# Patient Record
Sex: MALE | Race: WHITE | NOT HISPANIC OR LATINO | Employment: FULL TIME | URBAN - METROPOLITAN AREA
[De-identification: names, ages, dates, MRNs, and addresses within clinical notes are randomized per-mention and may not be internally consistent; named-entity substitution may affect disease eponyms.]

---

## 2017-09-26 ENCOUNTER — HOSPITAL ENCOUNTER (OUTPATIENT)
Dept: FAMILY MEDICINE CLINIC | Facility: CLINIC | Age: 37
Setting detail: SPECIMEN
Discharge: HOME | End: 2017-09-26
Attending: NURSE PRACTITIONER | Admitting: NURSE PRACTITIONER

## 2017-12-06 ENCOUNTER — HOSPITAL ENCOUNTER (OUTPATIENT)
Dept: PHYSICAL THERAPY | Facility: HOSPITAL | Age: 37
Setting detail: RECURRING SERIES
Discharge: HOME | End: 2018-01-18
Attending: PHYSICIAN ASSISTANT | Admitting: PHYSICIAN ASSISTANT

## 2018-06-01 ENCOUNTER — HOSPITAL ENCOUNTER (OUTPATIENT)
Dept: FAMILY MEDICINE CLINIC | Facility: CLINIC | Age: 38
Setting detail: SPECIMEN
Discharge: HOME | End: 2018-06-01
Attending: PHYSICIAN ASSISTANT | Admitting: PHYSICIAN ASSISTANT

## 2018-06-01 LAB
ALBUMIN SERPL-MCNC: 4.5 G/DL (ref 3.5–4.8)
ALBUMIN/GLOB SERPL: 1.9 {RATIO} (ref 1–1.7)
ALP SERPL-CCNC: 50 IU/L (ref 32–91)
ALT SERPL-CCNC: 33 IU/L (ref 17–63)
ANION GAP SERPL CALC-SCNC: 11.3 MMOL/L (ref 10–20)
AST SERPL-CCNC: 25 IU/L (ref 15–41)
BASOPHILS # BLD AUTO: 0 10*3/UL (ref 0–0.2)
BASOPHILS NFR BLD AUTO: 0 % (ref 0–2)
BILIRUB SERPL-MCNC: 0.7 MG/DL (ref 0.3–1.2)
BUN SERPL-MCNC: 11 MG/DL (ref 8–20)
BUN/CREAT SERPL: 11 (ref 6.2–20.3)
CALCIUM SERPL-MCNC: 9.3 MG/DL (ref 8.9–10.3)
CHLORIDE SERPL-SCNC: 104 MMOL/L (ref 101–111)
CHOLEST SERPL-MCNC: 182 MG/DL
CHOLEST/HDLC SERPL: 7 {RATIO}
CONV CO2: 27 MMOL/L (ref 22–32)
CONV LDL CHOLESTEROL DIRECT: 116 MG/DL (ref 0–100)
CONV TOTAL PROTEIN: 6.9 G/DL (ref 6.1–7.9)
CREAT UR-MCNC: 1 MG/DL (ref 0.7–1.2)
DIFFERENTIAL METHOD BLD: (no result)
EOSINOPHIL # BLD AUTO: 0.1 10*3/UL (ref 0–0.3)
EOSINOPHIL # BLD AUTO: 2 % (ref 0–3)
ERYTHROCYTE [DISTWIDTH] IN BLOOD BY AUTOMATED COUNT: 13.1 % (ref 11.5–14.5)
GLOBULIN UR ELPH-MCNC: 2.4 G/DL (ref 2.5–3.8)
GLUCOSE SERPL-MCNC: 110 MG/DL (ref 65–99)
HCT VFR BLD AUTO: 45.7 % (ref 40–54)
HDLC SERPL-MCNC: 26 MG/DL
HGB BLD-MCNC: 15.6 G/DL (ref 14–18)
LDLC/HDLC SERPL: 4.4 {RATIO}
LIPID INTERPRETATION: ABNORMAL
LYMPHOCYTES # BLD AUTO: 2.1 10*3/UL (ref 0.8–4.8)
LYMPHOCYTES NFR BLD AUTO: 38 % (ref 18–42)
MCH RBC QN AUTO: 27.6 PG (ref 26–32)
MCHC RBC AUTO-ENTMCNC: 34.1 G/DL (ref 32–36)
MCV RBC AUTO: 81 FL (ref 80–94)
MONOCYTES # BLD AUTO: 0.4 10*3/UL (ref 0.1–1.3)
MONOCYTES NFR BLD AUTO: 7 % (ref 2–11)
NEUTROPHILS # BLD AUTO: 2.9 10*3/UL (ref 2.3–8.6)
NEUTROPHILS NFR BLD AUTO: 53 % (ref 50–75)
NRBC BLD AUTO-RTO: 0 /100{WBCS}
NRBC/RBC NFR BLD MANUAL: 0 10*3/UL
PLATELET # BLD AUTO: 191 10*3/UL (ref 150–450)
PMV BLD AUTO: 9.6 FL (ref 7.4–10.4)
POTASSIUM SERPL-SCNC: 4.3 MMOL/L (ref 3.6–5.1)
RBC # BLD AUTO: 5.64 10*6/UL (ref 4.6–6)
SODIUM SERPL-SCNC: 138 MMOL/L (ref 136–144)
TRIGL SERPL-MCNC: 141 MG/DL
VLDLC SERPL CALC-MCNC: 40.2 MG/DL
WBC # BLD AUTO: 5.5 10*3/UL (ref 4.5–11.5)

## 2019-04-15 ENCOUNTER — HOSPITAL ENCOUNTER (OUTPATIENT)
Dept: LAB | Facility: HOSPITAL | Age: 39
Discharge: HOME | End: 2019-04-15
Attending: FAMILY MEDICINE | Admitting: FAMILY MEDICINE

## 2019-04-15 LAB
BACTERIA SPEC AEROBE CULT: NORMAL
C DIFF TOX GENS STL QL NAA+PROBE: NEGATIVE
CDIFF INTERPRETATION: NORMAL
CONV CDIFF GDH AG: NEGATIVE
CRYPTOSP AG STL QL IA: NEGATIVE
Lab: NORMAL
MICRO REPORT STATUS: NORMAL
SPECIMEN SOURCE: NORMAL

## 2019-11-22 ENCOUNTER — APPOINTMENT (OUTPATIENT)
Dept: NUCLEAR MEDICINE | Facility: HOSPITAL | Age: 39
End: 2019-11-22

## 2019-11-22 ENCOUNTER — HOSPITAL ENCOUNTER (OUTPATIENT)
Facility: HOSPITAL | Age: 39
Setting detail: OBSERVATION
Discharge: HOME | End: 2019-11-22
Attending: EMERGENCY MEDICINE | Admitting: INTERNAL MEDICINE

## 2019-11-22 ENCOUNTER — APPOINTMENT (OUTPATIENT)
Dept: GENERAL RADIOLOGY | Facility: HOSPITAL | Age: 39
End: 2019-11-22

## 2019-11-22 VITALS
HEIGHT: 72 IN | BODY MASS INDEX: 27.74 KG/M2 | DIASTOLIC BLOOD PRESSURE: 73 MMHG | TEMPERATURE: 98.3 F | RESPIRATION RATE: 16 BRPM | SYSTOLIC BLOOD PRESSURE: 137 MMHG | OXYGEN SATURATION: 96 % | HEART RATE: 80 BPM | WEIGHT: 204.81 LBS

## 2019-11-22 DIAGNOSIS — R07.9 CHEST PAIN, UNSPECIFIED TYPE: Primary | ICD-10-CM

## 2019-11-22 PROBLEM — K21.9 GASTROESOPHAGEAL REFLUX DISEASE: Status: ACTIVE | Noted: 2019-11-22

## 2019-11-22 PROBLEM — F32.A DEPRESSION: Chronic | Status: ACTIVE | Noted: 2019-11-22

## 2019-11-22 LAB
ANION GAP SERPL CALCULATED.3IONS-SCNC: 14 MMOL/L (ref 5–15)
BASOPHILS # BLD AUTO: 0 10*3/MM3 (ref 0–0.2)
BASOPHILS NFR BLD AUTO: 0.3 % (ref 0–1.5)
BH CV NUCLEAR PRIOR STUDY: 3
BH CV STRESS BP STAGE 1: NORMAL
BH CV STRESS BP STAGE 2: NORMAL
BH CV STRESS DURATION MIN STAGE 1: 3
BH CV STRESS DURATION MIN STAGE 2: 3
BH CV STRESS DURATION MIN STAGE 3: 3
BH CV STRESS DURATION SEC STAGE 1: 0
BH CV STRESS DURATION SEC STAGE 2: 0
BH CV STRESS DURATION SEC STAGE 3: 0
BH CV STRESS GRADE STAGE 1: 10
BH CV STRESS GRADE STAGE 2: 12
BH CV STRESS GRADE STAGE 3: 14
BH CV STRESS HR STAGE 1: 117
BH CV STRESS HR STAGE 2: 136
BH CV STRESS HR STAGE 3: 159
BH CV STRESS METS STAGE 1: 5
BH CV STRESS METS STAGE 2: 7.5
BH CV STRESS METS STAGE 3: 10
BH CV STRESS PROTOCOL 1: NORMAL
BH CV STRESS RECOVERY BP: NORMAL MMHG
BH CV STRESS RECOVERY HR: 110 BPM
BH CV STRESS SPEED STAGE 1: 1.7
BH CV STRESS SPEED STAGE 2: 2.5
BH CV STRESS SPEED STAGE 3: 3.4
BH CV STRESS STAGE 1: 1
BH CV STRESS STAGE 2: 2
BH CV STRESS STAGE 3: 3
BUN BLD-MCNC: 11 MG/DL (ref 6–20)
BUN/CREAT SERPL: 11.2 (ref 7–25)
CALCIUM SPEC-SCNC: 8.9 MG/DL (ref 8.6–10.5)
CHLORIDE SERPL-SCNC: 100 MMOL/L (ref 98–107)
CO2 SERPL-SCNC: 23 MMOL/L (ref 22–29)
CREAT BLD-MCNC: 0.98 MG/DL (ref 0.76–1.27)
D DIMER PPP FEU-MCNC: <0.17 MCGFEU/ML (ref 0.17–0.59)
DEPRECATED RDW RBC AUTO: 38.9 FL (ref 37–54)
EOSINOPHIL # BLD AUTO: 0 10*3/MM3 (ref 0–0.4)
EOSINOPHIL NFR BLD AUTO: 0.7 % (ref 0.3–6.2)
ERYTHROCYTE [DISTWIDTH] IN BLOOD BY AUTOMATED COUNT: 13.3 % (ref 12.3–15.4)
GFR SERPL CREATININE-BSD FRML MDRD: 85 ML/MIN/1.73
GLUCOSE BLD-MCNC: 118 MG/DL (ref 65–99)
HCT VFR BLD AUTO: 44.9 % (ref 37.5–51)
HGB BLD-MCNC: 15.2 G/DL (ref 13–17.7)
LV EF NUC BP: 65 %
LYMPHOCYTES # BLD AUTO: 2.2 10*3/MM3 (ref 0.7–3.1)
LYMPHOCYTES NFR BLD AUTO: 30.8 % (ref 19.6–45.3)
MAXIMAL PREDICTED HEART RATE: 180 BPM
MCH RBC QN AUTO: 28.1 PG (ref 26.6–33)
MCHC RBC AUTO-ENTMCNC: 33.8 G/DL (ref 31.5–35.7)
MCV RBC AUTO: 83.1 FL (ref 79–97)
MONOCYTES # BLD AUTO: 0.3 10*3/MM3 (ref 0.1–0.9)
MONOCYTES NFR BLD AUTO: 4.2 % (ref 5–12)
NEUTROPHILS # BLD AUTO: 4.6 10*3/MM3 (ref 1.7–7)
NEUTROPHILS NFR BLD AUTO: 64 % (ref 42.7–76)
NRBC BLD AUTO-RTO: 0.1 /100 WBC (ref 0–0.2)
PERCENT MAX PREDICTED HR: 88.33 %
PLATELET # BLD AUTO: 217 10*3/MM3 (ref 140–450)
PMV BLD AUTO: 8.8 FL (ref 6–12)
POTASSIUM BLD-SCNC: 3.9 MMOL/L (ref 3.5–5.2)
RBC # BLD AUTO: 5.4 10*6/MM3 (ref 4.14–5.8)
SODIUM BLD-SCNC: 137 MMOL/L (ref 136–145)
STRESS BASELINE BP: NORMAL MMHG
STRESS BASELINE HR: 99 BPM
STRESS PERCENT HR: 104 %
STRESS POST EXERCISE DUR MIN: 8 MIN
STRESS POST EXERCISE DUR SEC: 15 SEC
STRESS POST PEAK BP: NORMAL MMHG
STRESS POST PEAK HR: 159 BPM
STRESS TARGET HR: 153 BPM
TROPONIN T SERPL-MCNC: <0.01 NG/ML (ref 0–0.03)
TROPONIN T SERPL-MCNC: <0.01 NG/ML (ref 0–0.03)
WBC NRBC COR # BLD: 7.2 10*3/MM3 (ref 3.4–10.8)

## 2019-11-22 PROCEDURE — 99284 EMERGENCY DEPT VISIT MOD MDM: CPT

## 2019-11-22 PROCEDURE — 84484 ASSAY OF TROPONIN QUANT: CPT | Performed by: EMERGENCY MEDICINE

## 2019-11-22 PROCEDURE — 0 TECHNETIUM SESTAMIBI: Performed by: INTERNAL MEDICINE

## 2019-11-22 PROCEDURE — 99235 HOSP IP/OBS SAME DATE MOD 70: CPT | Performed by: INTERNAL MEDICINE

## 2019-11-22 PROCEDURE — G0378 HOSPITAL OBSERVATION PER HR: HCPCS

## 2019-11-22 PROCEDURE — 93016 CV STRESS TEST SUPVJ ONLY: CPT | Performed by: NURSE PRACTITIONER

## 2019-11-22 PROCEDURE — 36415 COLL VENOUS BLD VENIPUNCTURE: CPT

## 2019-11-22 PROCEDURE — 80048 BASIC METABOLIC PNL TOTAL CA: CPT | Performed by: EMERGENCY MEDICINE

## 2019-11-22 PROCEDURE — 78452 HT MUSCLE IMAGE SPECT MULT: CPT | Performed by: INTERNAL MEDICINE

## 2019-11-22 PROCEDURE — 93017 CV STRESS TEST TRACING ONLY: CPT

## 2019-11-22 PROCEDURE — 71045 X-RAY EXAM CHEST 1 VIEW: CPT

## 2019-11-22 PROCEDURE — 93018 CV STRESS TEST I&R ONLY: CPT | Performed by: INTERNAL MEDICINE

## 2019-11-22 PROCEDURE — 85379 FIBRIN DEGRADATION QUANT: CPT | Performed by: EMERGENCY MEDICINE

## 2019-11-22 PROCEDURE — A9500 TC99M SESTAMIBI: HCPCS | Performed by: INTERNAL MEDICINE

## 2019-11-22 PROCEDURE — 85025 COMPLETE CBC W/AUTO DIFF WBC: CPT | Performed by: EMERGENCY MEDICINE

## 2019-11-22 PROCEDURE — 93005 ELECTROCARDIOGRAM TRACING: CPT | Performed by: EMERGENCY MEDICINE

## 2019-11-22 PROCEDURE — 78452 HT MUSCLE IMAGE SPECT MULT: CPT

## 2019-11-22 RX ORDER — NITROGLYCERIN 0.4 MG/1
0.4 TABLET SUBLINGUAL
Status: DISCONTINUED | OUTPATIENT
Start: 2019-11-22 | End: 2019-11-22 | Stop reason: HOSPADM

## 2019-11-22 RX ORDER — ASPIRIN 325 MG
325 TABLET ORAL ONCE
Status: COMPLETED | OUTPATIENT
Start: 2019-11-22 | End: 2019-11-22

## 2019-11-22 RX ORDER — MORPHINE SULFATE 4 MG/ML
4 INJECTION, SOLUTION INTRAMUSCULAR; INTRAVENOUS ONCE
Status: DISCONTINUED | OUTPATIENT
Start: 2019-11-22 | End: 2019-11-22 | Stop reason: HOSPADM

## 2019-11-22 RX ORDER — NICOTINE 21 MG/24HR
1 PATCH, TRANSDERMAL 24 HOURS TRANSDERMAL EVERY 24 HOURS
Status: DISCONTINUED | OUTPATIENT
Start: 2019-11-22 | End: 2019-11-22 | Stop reason: HOSPADM

## 2019-11-22 RX ORDER — SODIUM CHLORIDE 0.9 % (FLUSH) 0.9 %
10 SYRINGE (ML) INJECTION AS NEEDED
Status: DISCONTINUED | OUTPATIENT
Start: 2019-11-22 | End: 2019-11-22 | Stop reason: HOSPADM

## 2019-11-22 RX ORDER — ACETAMINOPHEN 650 MG/1
650 SUPPOSITORY RECTAL EVERY 4 HOURS PRN
Status: DISCONTINUED | OUTPATIENT
Start: 2019-11-22 | End: 2019-11-22 | Stop reason: HOSPADM

## 2019-11-22 RX ORDER — ACETAMINOPHEN 325 MG/1
650 TABLET ORAL EVERY 4 HOURS PRN
Status: DISCONTINUED | OUTPATIENT
Start: 2019-11-22 | End: 2019-11-22 | Stop reason: HOSPADM

## 2019-11-22 RX ORDER — ESCITALOPRAM OXALATE 10 MG/1
10 TABLET ORAL DAILY
Status: DISCONTINUED | OUTPATIENT
Start: 2019-11-22 | End: 2019-11-22 | Stop reason: HOSPADM

## 2019-11-22 RX ORDER — SODIUM CHLORIDE 0.9 % (FLUSH) 0.9 %
10 SYRINGE (ML) INJECTION EVERY 12 HOURS SCHEDULED
Status: DISCONTINUED | OUTPATIENT
Start: 2019-11-22 | End: 2019-11-22 | Stop reason: HOSPADM

## 2019-11-22 RX ORDER — ACETAMINOPHEN 160 MG/5ML
650 SOLUTION ORAL EVERY 4 HOURS PRN
Status: DISCONTINUED | OUTPATIENT
Start: 2019-11-22 | End: 2019-11-22 | Stop reason: HOSPADM

## 2019-11-22 RX ORDER — PANTOPRAZOLE SODIUM 40 MG/1
40 TABLET, DELAYED RELEASE ORAL EVERY MORNING
Status: DISCONTINUED | OUTPATIENT
Start: 2019-11-22 | End: 2019-11-22 | Stop reason: HOSPADM

## 2019-11-22 RX ORDER — BUPROPION HYDROCHLORIDE 150 MG/1
300 TABLET ORAL DAILY
Status: DISCONTINUED | OUTPATIENT
Start: 2019-11-22 | End: 2019-11-22 | Stop reason: HOSPADM

## 2019-11-22 RX ORDER — ONDANSETRON 2 MG/ML
4 INJECTION INTRAMUSCULAR; INTRAVENOUS ONCE
Status: DISCONTINUED | OUTPATIENT
Start: 2019-11-22 | End: 2019-11-22 | Stop reason: HOSPADM

## 2019-11-22 RX ADMIN — TECHNETIUM TC 99M SESTAMIBI 1 DOSE: 1 INJECTION INTRAVENOUS at 13:55

## 2019-11-22 RX ADMIN — TECHNETIUM TC 99M SESTAMIBI 1 DOSE: 1 INJECTION INTRAVENOUS at 12:30

## 2019-11-22 RX ADMIN — Medication 10 ML: at 12:27

## 2019-11-22 RX ADMIN — ASPIRIN 325 MG ORAL TABLET 325 MG: 325 PILL ORAL at 09:43

## 2019-11-22 NOTE — ED PROVIDER NOTES
Subjective   Chief complaint chest pain    History of present illness 38-year-old male complains of a cramp in his left chest that has been intermittent since yesterday morning.  No neck arm or jaw pain or shortness of breath he states it does hurt to take a deep breath and it seems to be worse with movement and exertion and better with rest.  Symptoms are moderate.  No fever chills cough congestion no leg pain or swelling no recent long car ride plane ride immobilization or foreign travels.            Review of Systems   Constitutional: Negative for chills and fever.   HENT: Negative for congestion and sinus pressure.    Eyes: Negative for photophobia and visual disturbance.   Respiratory: Positive for chest tightness. Negative for shortness of breath.    Cardiovascular: Positive for chest pain. Negative for leg swelling.   Gastrointestinal: Negative for abdominal pain and vomiting.   Endocrine: Negative for cold intolerance and heat intolerance.   Genitourinary: Negative for difficulty urinating and dysuria.   Musculoskeletal: Negative for arthralgias and back pain.   Skin: Negative for color change and pallor.   Neurological: Negative for dizziness and light-headedness.   Psychiatric/Behavioral: Negative for agitation and behavioral problems.       Past Medical History:   Diagnosis Date   • ADD (attention deficit disorder)    • Arthritis    • Depression    • GERD (gastroesophageal reflux disease)        No Known Allergies    Past Surgical History:   Procedure Laterality Date   • [facility]  [facility]       History reviewed. No pertinent family history.    Social History     Socioeconomic History   • Marital status:      Spouse name: Not on file   • Number of children: Not on file   • Years of education: Not on file   • Highest education level: Not on file   Tobacco Use   • Smoking status: Current Every Day Smoker     Packs/day: 0.50     Years: 20.00     Pack years: 10.00      Types: Cigarettes   • Smokeless tobacco: Never Used   Substance and Sexual Activity   • Alcohol use: No     Frequency: Never   • Drug use: No       Prior to Admission medications    Medication Sig Start Date End Date Taking? Authorizing Provider   buPROPion XL (WELLBUTRIN XL) 150 MG 24 hr tablet Take 150 mg by mouth Daily. 300 mg daily    Emergency, Nurse BRUNA Brown   escitalopram (LEXAPRO) 10 MG tablet Take 10 mg by mouth Daily.    Emergency, Nurse Epic, RN   neomycin-polymyxin-hydrocortisone (CORTISPORIN) 1 % solution otic solution Three drops in the affected ear three times a day for 7 days 12/17/18   Isidro Nair MD   omeprazole (priLOSEC) 20 MG capsule Take 20 mg by mouth Daily.    Emergency, Nurse Epic, RN         Objective   Physical Exam  38-year-old male awake alert no acute distress HEENT extraocular muscles intact pupils equal round reactive mouth is clear neck is supple no adenopathy no JVD no bruits lungs clear no retractions heart regular without murmur chest wall is nontender abdomen soft without tenderness or masses extremities pulses are equal throughout upper lower extremities there is no edema no cords or Homans sign no evidence of DVT.  Patient's awake alert follows commands no facial asymmetry normal speech no focal weakness.  Procedures           ED Course      Results for orders placed or performed during the hospital encounter of 11/22/19   Basic Metabolic Panel   Result Value Ref Range    Glucose 118 (H) 65 - 99 mg/dL    BUN 11 6 - 20 mg/dL    Creatinine 0.98 0.76 - 1.27 mg/dL    Sodium 137 136 - 145 mmol/L    Potassium 3.9 3.5 - 5.2 mmol/L    Chloride 100 98 - 107 mmol/L    CO2 23.0 22.0 - 29.0 mmol/L    Calcium 8.9 8.6 - 10.5 mg/dL    eGFR Non African Amer 85 >60 mL/min/1.73    BUN/Creatinine Ratio 11.2 7.0 - 25.0    Anion Gap 14.0 5.0 - 15.0 mmol/L   Troponin   Result Value Ref Range    Troponin T <0.010 0.000 - 0.030 ng/mL   D-dimer, Quantitative   Result Value Ref Range    D-Dimer,  Quantitative <0.17 (L) 0.17 - 0.59 MCGFEU/mL   CBC Auto Differential   Result Value Ref Range    WBC 7.20 3.40 - 10.80 10*3/mm3    RBC 5.40 4.14 - 5.80 10*6/mm3    Hemoglobin 15.2 13.0 - 17.7 g/dL    Hematocrit 44.9 37.5 - 51.0 %    MCV 83.1 79.0 - 97.0 fL    MCH 28.1 26.6 - 33.0 pg    MCHC 33.8 31.5 - 35.7 g/dL    RDW 13.3 12.3 - 15.4 %    RDW-SD 38.9 37.0 - 54.0 fl    MPV 8.8 6.0 - 12.0 fL    Platelets 217 140 - 450 10*3/mm3    Neutrophil % 64.0 42.7 - 76.0 %    Lymphocyte % 30.8 19.6 - 45.3 %    Monocyte % 4.2 (L) 5.0 - 12.0 %    Eosinophil % 0.7 0.3 - 6.2 %    Basophil % 0.3 0.0 - 1.5 %    Neutrophils, Absolute 4.60 1.70 - 7.00 10*3/mm3    Lymphocytes, Absolute 2.20 0.70 - 3.10 10*3/mm3    Monocytes, Absolute 0.30 0.10 - 0.90 10*3/mm3    Eosinophils, Absolute 0.00 0.00 - 0.40 10*3/mm3    Basophils, Absolute 0.00 0.00 - 0.20 10*3/mm3    nRBC 0.1 0.0 - 0.2 /100 WBC     Xr Chest 1 View    Result Date: 11/22/2019  Normal chest  Electronically Signed By-Vinny García On:11/22/2019 9:46 AM This report was finalized on [id number] by  Vinny García, .    Medications   sodium chloride 0.9 % flush 10 mL (not administered)   Morphine sulfate (PF) injection 4 mg (not administered)   ondansetron (ZOFRAN) injection 4 mg (not administered)   aspirin tablet 325 mg (325 mg Oral Given 11/22/19 0943)     EKG my interpretation normal sinus rhythm rate 100 normal axis no hypertrophy QTC of 437 normal EKG              MDM  Number of Diagnoses or Management Options  Chest pain, unspecified type:   Diagnosis management comments: Medical decision making.  Patient IV established placed on monitor given aspirin p.o. patient did not want any pain medication.  The patient CBC electrolytes d-dimer troponin within normal limits chest x-ray negative.  The patient repeat exam is resting comfortably.  He does have risk factors for heart disease he is a smoker he has had some exertional left-sided chest pain.  I see no evidence of acute DVT or  pulmonary embolism or heart or aortic dissection by clinical exam.  We talked about these findings.  We talked about outpatient inpatient.  He will be placed in observation hospitalist paged stable unremarkable improved ER course.      Final diagnoses:   Chest pain, unspecified type              Vinny Rosa MD  11/22/19 2580

## 2019-11-22 NOTE — DISCHARGE SUMMARY
[facility] Medicine Services  DISCHARGE SUMMARY        Prepared For PCP:  Aleah Alcantar PA    Patient Name: Max Triana  : 1980  MRN: [id number]      Date of Admission:   2019    Date of Discharge:  2019    Length of stay:  LOS: 0 days     Hospital Course     Presenting Problem:   Chest pain, unspecified type [R07.9]    Active Hospital Problems:  * Chest pain- (present on admission)    -EKG Sinus Tachycardia  -Troponin normal  -Received ASA 325mg  -Stress Test neg  -Check serial Troponins     Depression- (present on admission)     -Continue Wellbutrin and Lexapro     Gastroesophageal reflux disease- (present on admission)    -Continue PPI         Resolved Hospital Problems:  No notes have been filed under this hospital service.  Service: Hospitalist    Hospital Course:  Max Triana is a 38 y.o. male presented to [facility] ED on 19, with c/o left sided ChestPain that began this morning while he was at work. Pt states pain is off and on, sharp and does not radiate. He states he did have some SOA and lighthadedness initially with the pain, but has now resolved. Pt denies nausea, vomiting, dizziness and diaphoresis.      Pt was given ASA 325mg in the ED. Pt's  EKG was Sinus Tachycardia, otherwise normal. CXR: No active disease, lungs clear. Troponin negative, other labs within normal limits.  Patient was admitted to the medical floor, underwent stress test negative, will charge patient home, continuous murmur, follow with PCP in a week.         Recommendation for Outpatient Providers:     Reasons For Change In Medications and Indications for New Medications:        Day of Discharge     HPI:       Vital Signs:   Temp:  [98.1 °F (36.7 °C)-98.6 °F (37 °C)] 98.3 °F (36.8 °C)  Heart Rate:  [] 80  Resp:  [14-17] 16  BP: (122-143)/(65-95) 137/73     Physical Exam:  Physical Exam   Constitutional: He is oriented to person, place, and time. He appears  well-developed and well-nourished. No distress.   HENT:   Head: Normocephalic and atraumatic.   Right Ear: External ear normal.   Left Ear: External ear normal.   Nose: Nose normal.   Mouth/Throat: Oropharynx is clear and moist. No oropharyngeal exudate.   Eyes: Conjunctivae and EOM are normal. Pupils are equal, round, and reactive to light. Right eye exhibits no discharge. Left eye exhibits no discharge. No scleral icterus.   Neck: Normal range of motion. No JVD present. No tracheal deviation present. No thyromegaly present.   Cardiovascular: Normal rate, regular rhythm, normal heart sounds and intact distal pulses. Exam reveals no gallop and no friction rub.   No murmur heard.  Pulmonary/Chest: Effort normal and breath sounds normal. No stridor. No respiratory distress. He has no wheezes. He has no rales. He exhibits no tenderness.   Abdominal: Soft. Bowel sounds are normal. He exhibits no distension and no mass. There is no tenderness. There is no rebound and no guarding. No hernia.   Musculoskeletal: Normal range of motion. He exhibits no edema, tenderness or deformity.   Lymphadenopathy:     He has no cervical adenopathy.   Neurological: He is alert and oriented to person, place, and time. No cranial nerve deficit or sensory deficit. He exhibits normal muscle tone. Coordination normal.   Skin: Skin is warm and dry. No rash noted. He is not diaphoretic. No erythema.   Psychiatric: He has a normal mood and affect. His behavior is normal.   Nursing note and vitals reviewed.      Pertinent  and/or Most Recent Results     Results from last 7 days   Lab Units 11/22/19  0931   WBC 10*3/mm3 7.20   HEMOGLOBIN g/dL 15.2   HEMATOCRIT % 44.9   PLATELETS 10*3/mm3 217   SODIUM mmol/L 137   POTASSIUM mmol/L 3.9   CHLORIDE mmol/L 100   CO2 mmol/L 23.0   BUN mg/dL 11   CREATININE mg/dL 0.98   GLUCOSE mg/dL 118*   CALCIUM mg/dL 8.9           Invalid input(s): PROT, LABALBU        Invalid input(s): TG, LDLCALC, LDLREALC  Results  from last 7 days   Lab Units 11/22/19  1152 11/22/19  0931   TROPONIN T ng/mL <0.010 <0.010       Brief Urine Lab Results     None          Microbiology Results Abnormal     None          Xr Chest 1 View    Result Date: 11/22/2019  Impression: Normal chest  Electronically Signed By-Vinny García On:11/22/2019 9:46 AM This report was finalized on [id number] by  Vinny García, .                     Home or Self Care          Test Results Pending at Discharge        Procedures Performed           Consults:   Consults     Date and Time Order Name Status Description    11/22/2019 1028 Hospitalist (on-call MD unless specified) Completed             Discharge Details        Discharge Medications      Continue These Medications      Instructions Start Date   buPROPion  MG 24 hr tablet  Commonly known as:  WELLBUTRIN XL   300 mg, Oral, Daily      escitalopram 10 MG tablet  Commonly known as:  LEXAPRO   10 mg, Oral, Daily      omeprazole 20 MG capsule  Commonly known as:  priLOSEC   20 mg, Oral, Daily             No Known Allergies      Discharge Disposition:  Home or Self Care    Diet:  Hospital:  Diet Order   Procedures   • Diet Cardiac; Healthy Heart     Discharge Activity:     CODE STATUS:    Code Status and Medical Interventions:   Ordered at: 11/22/19 1141     Level Of Support Discussed With:    Patient     Code Status:    CPR     Medical Interventions (Level of Support Prior to Arrest):    Full     No future appointments.      Condition on Discharge:      Stable      Follow-up Appointments  No future appointments.      Electronically signed by Abdirahman Ann MD, 11/22/19, 4:51 PM.    Time: I spent 35  minutes on this discharge activity which included face-to-face encounter with the patient/reviewing the data in the system/coordination of the care with the nursing staff as well as consultants/documentation/entering orders.

## 2019-11-22 NOTE — ASSESSMENT & PLAN NOTE
-EKG Sinus Tachycardia  -Troponin normal  -Received ASA 325mg  -Stress Test neg  -Check serial Troponins

## 2019-11-22 NOTE — H&P
[facility] Medicine Services      Patient Name: Max Triana  : 1980  MRN: [id number]  Primary Care Physician: Aleah Alcantar PA  Date of admission: 2019    Patient Care Team:  Aleah Alcantar PA as PCP - General  Aleah Alcantar PA as PCP - Family Medicine          Subjective   History Present Illness     Chief Complaint:   Chief Complaint   Patient presents with   • Chest Pain       Mr. Triana is a 38 y.o. male presented to [facility] ED on 19, with c/o left sided ChestPain that began this morning while he was at work. Pt states pain is off and on, sharp and does not radiate. He states he did have some SOA and lighthadedness initially with the pain, but has now resolved. Pt denies nausea, vomiting, dizziness and diaphoresis.     Pt was given ASA 325mg in the ED. Pt's  EKG was Sinus Tachycardia, otherwise normal. CXR: No active disease, lungs clear. Troponin negative, other labs within normal limits. Stress test ordered, will be admitted for observation.       Review of Systems   Constitution: Negative. Negative for diaphoresis, fever and weakness.   HENT: Negative.    Eyes: Negative.    Cardiovascular: Positive for chest pain.   Respiratory: Positive for shortness of breath.    Endocrine: Negative.    Hematologic/Lymphatic: Negative.    Skin: Negative.    Musculoskeletal: Negative.    Gastrointestinal: Negative.    Genitourinary: Negative.    Neurological: Positive for light-headedness.   Psychiatric/Behavioral: Negative.    Allergic/Immunologic: Negative.            Personal History     Past Medical History:   Past Medical History:   Diagnosis Date   • ADD (attention deficit disorder)    • Arthritis    • Depression    • GERD (gastroesophageal reflux disease)        Surgical History:      Past Surgical History:   Procedure Laterality Date   • DENTAL PROCEDURE      whisdom teeth extraction           Family History: He was adopted. Family history is unknown by  patient. Otherwise pertinent FHx was reviewed and unremarkable.     Social History:  reports that he has been smoking cigarettes.  He has a 10.00 pack-year smoking history. He has never used smokeless tobacco. He reports that he does not drink alcohol or use drugs.      Medications:  Prior to Admission medications    Medication Sig Start Date End Date Taking? Authorizing Provider   buPROPion XL (WELLBUTRIN XL) 150 MG 24 hr tablet Take 300 mg by mouth Daily.    Emergency, Nurse Kevin RN   escitalopram (LEXAPRO) 10 MG tablet Take 10 mg by mouth Daily.    Emergency, Nurse Kevin RN   omeprazole (priLOSEC) 20 MG capsule Take 20 mg by mouth Daily.    Emergency, Nurse Epic, RN   neomycin-polymyxin-hydrocortisone (CORTISPORIN) 1 % solution otic solution Three drops in the affected ear three times a day for 7 days 12/17/18 11/22/19  Isidro Nair MD       Allergies:  No Known Allergies    Objective   Objective     Vital Signs  Temp:  [98.1 °F (36.7 °C)] 98.1 °F (36.7 °C)  Heart Rate:  [] 95  Resp:  [17] 17  BP: (129-142)/(65-95) 131/65  SpO2:  [94 %-98 %] 94 %  on   ;   Device (Oxygen Therapy): room air  Body mass index is 29 kg/m².    Physical Exam   Constitutional: He is oriented to person, place, and time. He appears well-developed and well-nourished.   HENT:   Head: Normocephalic and atraumatic.   Cardiovascular: Normal rate, regular rhythm, normal heart sounds and intact distal pulses.   Pulmonary/Chest: Effort normal and breath sounds normal.   Abdominal: Soft. Bowel sounds are normal.   Musculoskeletal: Normal range of motion. He exhibits no edema.   Neurological: He is alert and oriented to person, place, and time.   Skin: Skin is warm and dry.       Results Review:  I have personally reviewed most recent cardiac tracings, lab results and radiology images and interpretations and agree with findings.    Results from last 7 days   Lab Units 11/22/19  0931   WBC 10*3/mm3 7.20   HEMOGLOBIN g/dL 15.2    HEMATOCRIT % 44.9   PLATELETS 10*3/mm3 217     Results from last 7 days   Lab Units 11/22/19 0931   SODIUM mmol/L 137   POTASSIUM mmol/L 3.9   CHLORIDE mmol/L 100   CO2 mmol/L 23.0   BUN mg/dL 11   CREATININE mg/dL 0.98   GLUCOSE mg/dL 118*   CALCIUM mg/dL 8.9   TROPONIN T ng/mL <0.010     Estimated Creatinine Clearance: 121 mL/min (by C-G formula based on SCr of 0.98 mg/dL).  Brief Urine Lab Results     None        Imaging Results (Last 24 Hours)     Procedure Component Value Units Date/Time    XR Chest 1 View [[id number]] Collected:  11/22/19 0946     Updated:  11/22/19 0949    Narrative:       DATE OF EXAM:  11/22/2019 9:36 AM     PROCEDURE:  XR CHEST 1 VW-     INDICATIONS:  Chest pain     COMPARISON:  No Comparisons Available     TECHNIQUE:   Single radiographic AP view of the chest was obtained.     FINDINGS:  The cardiovascular configuration is normal and the lungs are clear        Impression:       Normal chest     Electronically Signed By-Vinny García On:11/22/2019 9:46 AM  This report was finalized on [id number] by  Vinny García .            Microbiology Results (last 10 days)     ** No results found for the last 240 hours. **          ECG/EMG Results (most recent)     Procedure Component Value Units Date/Time    ECG 12 Lead [[id number]] Collected:  11/22/19 0918     Updated:  11/22/19 0920    Narrative:       HEART RATE= 104  bpm  RR Interval= 576  ms  NC Interval= 158  ms  P Horizontal Axis= 6  deg  P Front Axis= 51  deg  QRSD Interval= 83  ms  QT Interval= 332  ms  QRS Axis= 28  deg  T Wave Axis= 38  deg  - OTHERWISE NORMAL ECG -  Sinus tachycardia  No previous ECG available for comparison  Electronically Signed By:   Date and Time of Study: 2019-11-22 09:18:53                    Xr Chest 1 View    Result Date: 11/22/2019  Normal chest  Electronically Signed ByCecily García On:11/22/2019 9:46 AM This report was finalized on [id number] by  Vinny García .        Estimated Creatinine Clearance: 121  mL/min (by C-G formula based on SCr of 0.98 mg/dL).    Assessment/Plan   Assessment/Plan       Active Hospital Problems:  * Chest pain- (present on admission)    -EKG Sinus Tachycardia  -Troponin normal  -Received ASA 325mg  -Stress Test ordered  -Check serial Troponins     Depression- (present on admission)     -Continue Wellbutrin and Lexapro     Gastroesophageal reflux disease- (present on admission)    -Continue PPI                 VTE Prophylaxis - Encourage frequent ambulation.    CODE STATUS:    There are no questions and answers to display.       Admission Status:  I believe this patient meets observationcriteria.      I discussed the patients findings and my recommendations with patient.        Electronically signed by SAMANTHA Bernstein, 11/22/19, 11:08 AM.  [facility] Hospitalist Team

## 2019-11-22 NOTE — PLAN OF CARE
Problem: Patient Care Overview  Goal: Plan of Care Review  Outcome: Ongoing (interventions implemented as appropriate)   11/22/19 1225   Coping/Psychosocial   Plan of Care Reviewed With patient   Plan of Care Review   Progress no change   OTHER   Outcome Summary Pt waiting to get myoview today. Plan of care discussed.      Goal: Interprofessional Rounds/Family Conf  Outcome: Ongoing (interventions implemented as appropriate)   11/22/19 1225   Interdisciplinary Rounds/Family Conf   Participants nursing       Problem: Pain, Chronic (Adult)  Goal: Identify Related Risk Factors and Signs and Symptoms  Outcome: Ongoing (interventions implemented as appropriate)   11/22/19 1225   Pain, Chronic (Adult)   Signs and Symptoms (Chronic Pain) verbalization of pain descriptors     Goal: Acceptable Pain/Comfort Level and Functional Ability  Outcome: Ongoing (interventions implemented as appropriate)   11/22/19 1225   Pain, Chronic (Adult)   Acceptable Pain/Comfort Level and Functional Ability making progress toward outcome

## 2019-11-25 NOTE — PROGRESS NOTES
Case Management Discharge Note                  Final Discharge Disposition Code: 01 - home or self-care

## 2020-07-31 PROCEDURE — U0003 INFECTIOUS AGENT DETECTION BY NUCLEIC ACID (DNA OR RNA); SEVERE ACUTE RESPIRATORY SYNDROME CORONAVIRUS 2 (SARS-COV-2) (CORONAVIRUS DISEASE [COVID-19]), AMPLIFIED PROBE TECHNIQUE, MAKING USE OF HIGH THROUGHPUT TECHNOLOGIES AS DESCRIBED BY CMS-2020-01-R: HCPCS | Performed by: NURSE PRACTITIONER

## 2021-03-04 ENCOUNTER — HOSPITAL ENCOUNTER (EMERGENCY)
Facility: HOSPITAL | Age: 41
Discharge: HOME | End: 2021-03-04
Admitting: EMERGENCY MEDICINE

## 2021-03-04 ENCOUNTER — APPOINTMENT (OUTPATIENT)
Dept: CT IMAGING | Facility: HOSPITAL | Age: 41
End: 2021-03-04

## 2021-03-04 VITALS
OXYGEN SATURATION: 98 % | HEART RATE: 89 BPM | SYSTOLIC BLOOD PRESSURE: 122 MMHG | WEIGHT: 211.64 LBS | BODY MASS INDEX: 28.67 KG/M2 | HEIGHT: 72 IN | TEMPERATURE: 98 F | DIASTOLIC BLOOD PRESSURE: 76 MMHG | RESPIRATION RATE: 17 BRPM

## 2021-03-04 DIAGNOSIS — R10.30 LOWER ABDOMINAL PAIN: Primary | ICD-10-CM

## 2021-03-04 DIAGNOSIS — K92.1 BLOOD IN STOOL: ICD-10-CM

## 2021-03-04 LAB
ALBUMIN SERPL-MCNC: 4.3 G/DL (ref 3.5–5.2)
ALBUMIN/GLOB SERPL: 1.7 G/DL
ALP SERPL-CCNC: 57 U/L (ref 39–117)
ALT SERPL W P-5'-P-CCNC: 25 U/L (ref 1–41)
ANION GAP SERPL CALCULATED.3IONS-SCNC: 8 MMOL/L (ref 5–15)
AST SERPL-CCNC: 19 U/L (ref 1–40)
BASOPHILS # BLD AUTO: 0 10*3/MM3 (ref 0–0.2)
BASOPHILS NFR BLD AUTO: 0.5 % (ref 0–1.5)
BILIRUB SERPL-MCNC: 0.2 MG/DL (ref 0–1.2)
BILIRUB UR QL STRIP: NEGATIVE
BUN SERPL-MCNC: 14 MG/DL (ref 6–20)
BUN/CREAT SERPL: 14.9 (ref 7–25)
CALCIUM SPEC-SCNC: 9.2 MG/DL (ref 8.6–10.5)
CHLORIDE SERPL-SCNC: 102 MMOL/L (ref 98–107)
CLARITY UR: CLEAR
CO2 SERPL-SCNC: 27 MMOL/L (ref 22–29)
COLOR UR: YELLOW
CREAT SERPL-MCNC: 0.94 MG/DL (ref 0.76–1.27)
DEPRECATED RDW RBC AUTO: 37.6 FL (ref 37–54)
EOSINOPHIL # BLD AUTO: 0.2 10*3/MM3 (ref 0–0.4)
EOSINOPHIL NFR BLD AUTO: 2.1 % (ref 0.3–6.2)
ERYTHROCYTE [DISTWIDTH] IN BLOOD BY AUTOMATED COUNT: 13.2 % (ref 12.3–15.4)
ERYTHROCYTE [SEDIMENTATION RATE] IN BLOOD: 1 MM/HR (ref 0–15)
GFR SERPL CREATININE-BSD FRML MDRD: 88 ML/MIN/1.73
GLOBULIN UR ELPH-MCNC: 2.5 GM/DL
GLUCOSE SERPL-MCNC: 103 MG/DL (ref 65–99)
GLUCOSE UR STRIP-MCNC: NEGATIVE MG/DL
HCT VFR BLD AUTO: 44.2 % (ref 37.5–51)
HGB BLD-MCNC: 15.2 G/DL (ref 13–17.7)
HGB UR QL STRIP.AUTO: NEGATIVE
INR PPP: 1.08 (ref 0.93–1.1)
KETONES UR QL STRIP: NEGATIVE
LEUKOCYTE ESTERASE UR QL STRIP.AUTO: NEGATIVE
LYMPHOCYTES # BLD AUTO: 2.5 10*3/MM3 (ref 0.7–3.1)
LYMPHOCYTES NFR BLD AUTO: 33.7 % (ref 19.6–45.3)
MCH RBC QN AUTO: 28.1 PG (ref 26.6–33)
MCHC RBC AUTO-ENTMCNC: 34.4 G/DL (ref 31.5–35.7)
MCV RBC AUTO: 81.8 FL (ref 79–97)
MONOCYTES # BLD AUTO: 0.4 10*3/MM3 (ref 0.1–0.9)
MONOCYTES NFR BLD AUTO: 5.3 % (ref 5–12)
NEUTROPHILS NFR BLD AUTO: 4.4 10*3/MM3 (ref 1.7–7)
NEUTROPHILS NFR BLD AUTO: 58.4 % (ref 42.7–76)
NITRITE UR QL STRIP: NEGATIVE
NRBC BLD AUTO-RTO: 0.1 /100 WBC (ref 0–0.2)
PH UR STRIP.AUTO: 7.5 [PH] (ref 5–8)
PLATELET # BLD AUTO: 184 10*3/MM3 (ref 140–450)
PMV BLD AUTO: 9.5 FL (ref 6–12)
POTASSIUM SERPL-SCNC: 4.3 MMOL/L (ref 3.5–5.2)
PROT SERPL-MCNC: 6.8 G/DL (ref 6–8.5)
PROT UR QL STRIP: NEGATIVE
PROTHROMBIN TIME: 11.8 SECONDS (ref 9.6–11.7)
RBC # BLD AUTO: 5.41 10*6/MM3 (ref 4.14–5.8)
SODIUM SERPL-SCNC: 137 MMOL/L (ref 136–145)
SP GR UR STRIP: 1.02 (ref 1–1.03)
UROBILINOGEN UR QL STRIP: NORMAL
WBC # BLD AUTO: 7.6 10*3/MM3 (ref 3.4–10.8)

## 2021-03-04 PROCEDURE — 0 IOPAMIDOL PER 1 ML: Performed by: NURSE PRACTITIONER

## 2021-03-04 PROCEDURE — 74177 CT ABD & PELVIS W/CONTRAST: CPT

## 2021-03-04 PROCEDURE — 85610 PROTHROMBIN TIME: CPT | Performed by: NURSE PRACTITIONER

## 2021-03-04 PROCEDURE — 85652 RBC SED RATE AUTOMATED: CPT | Performed by: NURSE PRACTITIONER

## 2021-03-04 PROCEDURE — 99283 EMERGENCY DEPT VISIT LOW MDM: CPT

## 2021-03-04 PROCEDURE — 85025 COMPLETE CBC W/AUTO DIFF WBC: CPT | Performed by: NURSE PRACTITIONER

## 2021-03-04 PROCEDURE — 80053 COMPREHEN METABOLIC PANEL: CPT | Performed by: NURSE PRACTITIONER

## 2021-03-04 PROCEDURE — 81003 URINALYSIS AUTO W/O SCOPE: CPT | Performed by: NURSE PRACTITIONER

## 2021-03-04 RX ORDER — SODIUM CHLORIDE 0.9 % (FLUSH) 0.9 %
10 SYRINGE (ML) INJECTION AS NEEDED
Status: DISCONTINUED | OUTPATIENT
Start: 2021-03-04 | End: 2021-03-04 | Stop reason: HOSPADM

## 2021-03-04 RX ADMIN — IOPAMIDOL 100 ML: 755 INJECTION, SOLUTION INTRAVENOUS at 09:48

## 2021-03-04 RX ADMIN — SODIUM CHLORIDE 500 ML: 9 INJECTION, SOLUTION INTRAVENOUS at 08:59

## 2021-03-04 NOTE — DISCHARGE INSTRUCTIONS
Continue current home medications.  Drink plenty of fluids.  Close follow-up with your primary care provider and/or gastroenterology especially if your symptoms persist.  Return for new or worsening symptoms.

## 2021-03-04 NOTE — ED PROVIDER NOTES
Subjective   Patient is a 40-year-old white male with history of GERD who presents with complaints of lower abdominal pain and cramping with 2 small episodes of bright red blood mixed with his stool this morning.  States he has had some intermittent abdominal pain for the last couple of weeks that seems to be worse when he wakes up in the morning and lasts about noon and then goes away.  States he had this pain started on his way to work this morning and it worsened after he got to work.  He states he went to the restroom and had a bowel movement and noticed some blood on the tissue when he wiped.  He states he then had another bowel movement shortly after and noticed a few specks of blood in the toilet that was mixed with some brown stool.  He reports some nausea but denies any vomiting or hematemesis.  He denies any fever or chills.  Denies any urinary complaint.          Review of Systems   Constitutional: Negative for chills and fever.   Respiratory: Negative for shortness of breath.    Cardiovascular: Negative for chest pain.   Gastrointestinal: Positive for abdominal pain, blood in stool and nausea. Negative for constipation, diarrhea and vomiting.   Genitourinary: Negative for decreased urine volume, difficulty urinating, dysuria, flank pain, frequency and urgency.       Past Medical History:   Diagnosis Date   • ADD (attention deficit disorder)    • Arthritis    • Depression    • GERD (gastroesophageal reflux disease)        No Known Allergies    Past Surgical History:   Procedure Laterality Date   • DENTAL PROCEDURE  2012    whisdom teeth extraction       Family History   Adopted: Yes   Family history unknown: Yes       Social History     Socioeconomic History   • Marital status:      Spouse name: Not on file   • Number of children: Not on file   • Years of education: Not on file   • Highest education level: Not on file   Tobacco Use   • Smoking status: Current Every Day Smoker     Packs/day: 0.50      Years: 20.00     Pack years: 10.00     Types: Cigarettes   • Smokeless tobacco: Never Used   • Tobacco comment: no vaping/no passive exp.   Substance and Sexual Activity   • Alcohol use: No     Frequency: Never   • Drug use: No   • Sexual activity: Defer           Objective   Physical Exam  Vital signs and triage nurse note reviewed.  Constitutional: Awake, alert; well-developed and well-nourished. No acute distress is noted.  HEENT: Normocephalic, atraumatic; pupils are PERRL with intact EOM; oropharynx is pink and moist without exudate or erythema.  No drooling or pooling of oral secretions.  Neck: Supple, full range of motion without pain; no cervical lymphadenopathy. Normal phonation.  Cardiovascular: Regular rate and rhythm, normal S1-S2.  No murmur noted.  Pulmonary: Respiratory effort regular nonlabored, breath sounds clear to auscultation all fields.  Abdomen: Soft, mildly tender across the lower abdomen but greater in the right lower quadrant, nondistended with normoactive bowel sounds; no rebound or guarding.  No CVAT.  Musculoskeletal: Independent range of motion of all extremities with no palpable tenderness or edema.  Neuro: Alert oriented x3, speech is clear and appropriate, GCS 15.    Skin: Flesh tone, warm, dry, intact; no erythematous or petechial rash or lesion.      Procedures           ED Course      Labs Reviewed   COMPREHENSIVE METABOLIC PANEL - Abnormal; Notable for the following components:       Result Value    Glucose 103 (*)     All other components within normal limits    Narrative:     GFR Normal >60  Chronic Kidney Disease <60  Kidney Failure <15     PROTIME-INR - Abnormal; Notable for the following components:    Protime 11.8 (*)     All other components within normal limits   URINALYSIS W/ CULTURE IF INDICATED - Normal    Narrative:     Urine microscopic not indicated.   SEDIMENTATION RATE - Normal   CBC WITH AUTO DIFFERENTIAL - Normal   CBC AND DIFFERENTIAL    Narrative:     The  following orders were created for panel order CBC & Differential.  Procedure                               Abnormality         Status                     ---------                               -----------         ------                     CBC Auto Differential[[id number]]        Normal              Final result                 Please view results for these tests on the individual orders.     Ct Abdomen Pelvis With Contrast    Result Date: 3/4/2021   1. There are no acute findings in the abdomen or pelvis to explain the patient's abdominal pain or bloody stools. 2. Diverticular changes are scattered within the colon without evidence of acute diverticulitis. 3. The appendix is normal. 4. Low-density lesions in the right hepatic lobe. Benign etiologies such as cysts or hemangiomas are favored. Consider correlation to dedicated MRI abdomen without with contrast liver protocol, on a nonemergent basis. 5. Mild prostatomegaly.    Electronically Signed By-Katya Tinajero MD On:3/4/2021 9:53 AM This report was finalized on [id number] by  Katya Tinajero MD.    Medications   sodium chloride 0.9 % flush 10 mL (has no administration in time range)   sodium chloride 0.9 % bolus 500 mL (0 mL Intravenous Stopped 3/4/21 1047)   iopamidol (ISOVUE-370) 76 % injection 100 mL (100 mL Intravenous Given 3/4/21 0948)                                          MDM  Number of Diagnoses or Management Options  Diagnosis management comments: Comorbidities: GERD  Differentials: Hemorrhoid, IBS, IBD, diverticulitis, ischemic bowel, appendicitis, colitis;this list is not all inclusive and does not constitute the entirety of considered causes  Discussion with provider:  Radiology interpretation: X-rays reviewed by me and interpreted by radiologist: As above  Lab interpretation: Labs viewed by me significant for: As above    Patient had IV established.  He had labs and CT obtained.  He was given fluid bolus.    Patient has a grossly unremarkable  ED work-up today.  CBC shows a normal WBC.  Metabolic panel is grossly unremarkable.  Urinalysis is within normal limits.  CT of the abdomen pelvis shows no acute findings in the abdomen or pelvis.  Diverticular changes without evidence of diverticulitis.  Normal appendix.  Low-density lesion in the right hepatic lobe.  Benign etiologies such as cyst or hemangiomas are favored.  Follow-up MRI on a nonemergent basis may be considered.  Mild prostatomegaly.    On reexamination the patient is resting comfortably.  He is in no acute distress.  He denies any new complaints at this time.  He has had no further bowel movements since arrival to the ED.  He is well-appearing and has stable vital signs.    Diagnosis and treatment plan discussed with patient.  Patient agreeable to plan.   I discussed findings with patient who voices understanding of discharge instructions, signs and symptoms requiring return to ED; discharged improved and in stable condition with follow up for re-evaluation.           Amount and/or Complexity of Data Reviewed  Clinical lab tests: ordered and reviewed  Tests in the radiology section of CPT®: ordered and reviewed    Patient Progress  Patient progress: stable      Final diagnoses:   Lower abdominal pain   Blood in stool            Sharmin Cifuentes APRN  03/04/21 1031       Sharmin Cifuentes APRN  03/04/21 1047

## 2021-03-11 ENCOUNTER — OFFICE (AMBULATORY)
Dept: URBAN - METROPOLITAN AREA CLINIC 64 | Facility: CLINIC | Age: 41
End: 2021-03-11

## 2021-03-11 VITALS
DIASTOLIC BLOOD PRESSURE: 86 MMHG | WEIGHT: 210 LBS | HEART RATE: 81 BPM | HEIGHT: 72 IN | SYSTOLIC BLOOD PRESSURE: 130 MMHG

## 2021-03-11 DIAGNOSIS — K62.5 HEMORRHAGE OF ANUS AND RECTUM: ICD-10-CM

## 2021-03-11 DIAGNOSIS — R10.32 LEFT LOWER QUADRANT PAIN: ICD-10-CM

## 2021-03-11 DIAGNOSIS — R19.4 CHANGE IN BOWEL HABIT: ICD-10-CM

## 2021-03-11 PROCEDURE — 99203 OFFICE O/P NEW LOW 30 MIN: CPT | Performed by: INTERNAL MEDICINE

## 2021-03-11 RX ORDER — DICYCLOMINE HYDROCHLORIDE 20 MG/1
40 TABLET ORAL
Qty: 60 | Refills: 11 | Status: COMPLETED
Start: 2021-03-11 | End: 2022-04-14

## 2021-03-11 RX ORDER — METRONIDAZOLE 500 MG/1
1000 TABLET, FILM COATED ORAL
Qty: 28 | Refills: 0 | Status: COMPLETED
Start: 2021-03-11 | End: 2022-04-14

## 2021-03-22 ENCOUNTER — ON CAMPUS - OUTPATIENT (AMBULATORY)
Dept: URBAN - METROPOLITAN AREA HOSPITAL 2 | Facility: HOSPITAL | Age: 41
End: 2021-03-22

## 2021-03-22 VITALS
DIASTOLIC BLOOD PRESSURE: 80 MMHG | RESPIRATION RATE: 15 BRPM | DIASTOLIC BLOOD PRESSURE: 88 MMHG | DIASTOLIC BLOOD PRESSURE: 83 MMHG | OXYGEN SATURATION: 94 % | OXYGEN SATURATION: 98 % | SYSTOLIC BLOOD PRESSURE: 120 MMHG | HEART RATE: 90 BPM | HEART RATE: 99 BPM | DIASTOLIC BLOOD PRESSURE: 129 MMHG | OXYGEN SATURATION: 95 % | DIASTOLIC BLOOD PRESSURE: 89 MMHG | SYSTOLIC BLOOD PRESSURE: 141 MMHG | DIASTOLIC BLOOD PRESSURE: 55 MMHG | SYSTOLIC BLOOD PRESSURE: 153 MMHG | HEART RATE: 100 BPM | WEIGHT: 205 LBS | TEMPERATURE: 97.8 F | HEART RATE: 97 BPM | HEIGHT: 72 IN | HEART RATE: 89 BPM | HEART RATE: 94 BPM | SYSTOLIC BLOOD PRESSURE: 151 MMHG | SYSTOLIC BLOOD PRESSURE: 124 MMHG | OXYGEN SATURATION: 97 % | HEART RATE: 93 BPM | OXYGEN SATURATION: 100 % | SYSTOLIC BLOOD PRESSURE: 136 MMHG | HEART RATE: 85 BPM | OXYGEN SATURATION: 99 % | DIASTOLIC BLOOD PRESSURE: 73 MMHG | SYSTOLIC BLOOD PRESSURE: 123 MMHG | RESPIRATION RATE: 16 BRPM

## 2021-03-22 DIAGNOSIS — K57.30 DIVERTICULOSIS OF LARGE INTESTINE WITHOUT PERFORATION OR ABS: ICD-10-CM

## 2021-03-22 DIAGNOSIS — R19.4 CHANGE IN BOWEL HABIT: ICD-10-CM

## 2021-03-22 DIAGNOSIS — K62.5 HEMORRHAGE OF ANUS AND RECTUM: ICD-10-CM

## 2021-03-22 PROCEDURE — 45378 DIAGNOSTIC COLONOSCOPY: CPT | Performed by: INTERNAL MEDICINE

## 2021-08-26 PROCEDURE — U0004 COV-19 TEST NON-CDC HGH THRU: HCPCS | Performed by: FAMILY MEDICINE

## 2021-08-30 ENCOUNTER — TELEPHONE (OUTPATIENT)
Dept: URGENT CARE | Facility: CLINIC | Age: 41
End: 2021-08-30

## 2022-04-14 ENCOUNTER — OFFICE (AMBULATORY)
Dept: URBAN - METROPOLITAN AREA CLINIC 64 | Facility: CLINIC | Age: 42
End: 2022-04-14

## 2022-04-14 VITALS
WEIGHT: 213 LBS | SYSTOLIC BLOOD PRESSURE: 121 MMHG | DIASTOLIC BLOOD PRESSURE: 83 MMHG | HEART RATE: 76 BPM | HEIGHT: 72 IN

## 2022-04-14 DIAGNOSIS — K62.5 HEMORRHAGE OF ANUS AND RECTUM: ICD-10-CM

## 2022-04-14 DIAGNOSIS — R10.9 UNSPECIFIED ABDOMINAL PAIN: ICD-10-CM

## 2022-04-14 PROCEDURE — 99214 OFFICE O/P EST MOD 30 MIN: CPT | Performed by: INTERNAL MEDICINE

## 2022-04-14 RX ORDER — METRONIDAZOLE 500 MG/1
1000 TABLET, FILM COATED ORAL
Qty: 20 | Refills: 0 | Status: ACTIVE
Start: 2022-04-14

## 2022-04-14 RX ORDER — DICYCLOMINE HYDROCHLORIDE 20 MG/1
40 TABLET ORAL
Qty: 60 | Refills: 11 | Status: ACTIVE
Start: 2022-04-14

## 2023-09-07 ENCOUNTER — TELEPHONE (OUTPATIENT)
Dept: FAMILY MEDICINE CLINIC | Facility: CLINIC | Age: 43
End: 2023-09-07

## 2023-09-07 ENCOUNTER — OFFICE VISIT (OUTPATIENT)
Dept: FAMILY MEDICINE CLINIC | Facility: CLINIC | Age: 43
End: 2023-09-07
Payer: COMMERCIAL

## 2023-09-07 ENCOUNTER — HOSPITAL ENCOUNTER (OUTPATIENT)
Dept: GENERAL RADIOLOGY | Facility: HOSPITAL | Age: 43
Discharge: HOME | End: 2023-09-07
Admitting: NURSE PRACTITIONER
Payer: COMMERCIAL

## 2023-09-07 VITALS
BODY MASS INDEX: 29.8 KG/M2 | RESPIRATION RATE: 18 BRPM | WEIGHT: 220 LBS | OXYGEN SATURATION: 96 % | HEART RATE: 76 BPM | DIASTOLIC BLOOD PRESSURE: 80 MMHG | HEIGHT: 72 IN | SYSTOLIC BLOOD PRESSURE: 120 MMHG

## 2023-09-07 DIAGNOSIS — M54.2 CHRONIC NECK PAIN: ICD-10-CM

## 2023-09-07 DIAGNOSIS — Z76.89 ENCOUNTER TO ESTABLISH CARE: Primary | ICD-10-CM

## 2023-09-07 DIAGNOSIS — G89.29 CHRONIC NECK PAIN: ICD-10-CM

## 2023-09-07 DIAGNOSIS — R40.0 DAYTIME SLEEPINESS: ICD-10-CM

## 2023-09-07 PROCEDURE — 72050 X-RAY EXAM NECK SPINE 4/5VWS: CPT

## 2023-09-07 RX ORDER — FLUOXETINE HYDROCHLORIDE 20 MG/1
CAPSULE ORAL
COMMUNITY
Start: 2023-08-23

## 2023-09-07 RX ORDER — OLANZAPINE 5 MG/1
TABLET ORAL
COMMUNITY
Start: 2023-08-23

## 2023-09-07 NOTE — TELEPHONE ENCOUNTER
----- Message from SAMANTHA Martínez sent at 9/7/2023  2:50 PM EDT -----  Please call with x-ray result   The c-spine x rays are positive for degenerative changes (arthritis).  Range of motion exercises and staying hydrated can help with this, he can use over the counter anti inflammatory medicines such as aleve or ibuprofen, he can also use topical pain relievers such as biofreeze.  If he would like, we can make a referral for physical therapy which could also be helpful.    Thank you

## 2023-09-07 NOTE — TELEPHONE ENCOUNTER
HUB TO READ    The c-spine x rays are positive for degenerative changes (arthritis).  Range of motion exercises and staying hydrated can help with this, he can use over the counter anti inflammatory medicines such as aleve or ibuprofen, he can also use topical pain relievers such as biofreeze.  If he would like, we can make a referral for physical therapy which could also be helpful.    Thank you     SAW ON MY CHART

## 2023-09-07 NOTE — PROGRESS NOTES
"Chief Complaint  Establish Care, Insomnia, and Pain (neck)    Subjective          Max Triana presents to [facility]  History of Present Illness    Is a new patient, here today to establish care and for neck pain, and c/o daytime sleepiness  He is interested in having a sleep study done as he is concerned for sleep apnea    he has h/o gerd, depression, attention deficit, arthritis  Uses naproxen prn with reasonable relief of his pain  He declines referral to PT at this time, will wait to see how the xrays are    He is a current 1 ppd smoker    He does not drink, is a recovering alcoholic, has not drank in the past 15 months    He had a colonoscopy in march 2021, with plan for repeat in 10 years    Review of Systems   Musculoskeletal:  Positive for neck pain.        Has h/o neck pain 5 years ago, was diagnosed with arthritis, bone spurs, and degenerative disc     He endorses that he often has numbness and tingling down his left arm in the mornings, also when he leans on the arm while watching TV       Psychiatric/Behavioral:  Positive for sleep disturbance.         Tells me that he wakes up gasping for air in the night, his wife has to wake him up when he is not breathing correctly in the night, he does endorse snoring  Feels tired all of the time, takes naps frequently  Wakes up with morning headaches       Objective   Vital Signs:  /80 (BP Location: Left arm)   Pulse 76   Resp 18   Ht 182.9 cm (72.01\")   Wt 99.8 kg (220 lb)   SpO2 96%   BMI 29.83 kg/m²     BP Readings from Last 3 Encounters:   09/07/23 120/80   08/26/21 129/88   03/04/21 122/76        Wt Readings from Last 3 Encounters:   09/07/23 99.8 kg (220 lb)   08/26/21 90.7 kg (200 lb)   03/04/21 96 kg (211 lb 10.3 oz)        BMI is >= 25 and <30. (Overweight) The following options were offered after discussion;: exercise counseling/recommendations and nutrition counseling/recommendations      Physical " Exam  Vitals reviewed.   Constitutional:       Appearance: Normal appearance.   HENT:      Nose: Nose normal.      Mouth/Throat:      Mouth: Mucous membranes are moist.      Pharynx: Oropharynx is clear.   Neck:      Vascular: No carotid bruit.   Cardiovascular:      Rate and Rhythm: Normal rate and regular rhythm.      Pulses: Normal pulses.      Heart sounds: Normal heart sounds.   Pulmonary:      Effort: Pulmonary effort is normal.      Breath sounds: Normal breath sounds.   Musculoskeletal:         General: No swelling or tenderness. Normal range of motion.      Cervical back: Neck supple. No tenderness.   Lymphadenopathy:      Cervical: No cervical adenopathy.   Skin:     General: Skin is warm.   Neurological:      Mental Status: He is alert and oriented to person, place, and time.      Result Review :                 Assessment and Plan    Diagnoses and all orders for this visit:    1. Encounter to establish care (Primary)    2. Daytime sleepiness  -     Ambulatory Referral to Sleep Medicine    3. Chronic neck pain  -     XR Spine Cervical Complete 4 or 5 View; Future      Body mass index is 29.83 kg/m².         Follow Up   Return as needed, for Annual physical.  Patient was given instructions and counseling regarding his condition or for health maintenance advice. Please see specific information pulled into the AVS if appropriate.

## 2023-11-01 ENCOUNTER — OFFICE VISIT (OUTPATIENT)
Dept: SLEEP MEDICINE | Facility: CLINIC | Age: 43
End: 2023-11-01
Payer: COMMERCIAL

## 2023-11-01 VITALS
HEIGHT: 72 IN | OXYGEN SATURATION: 97 % | BODY MASS INDEX: 30.26 KG/M2 | RESPIRATION RATE: 12 BRPM | HEART RATE: 80 BPM | DIASTOLIC BLOOD PRESSURE: 83 MMHG | SYSTOLIC BLOOD PRESSURE: 126 MMHG | WEIGHT: 223.4 LBS

## 2023-11-01 DIAGNOSIS — E66.9 CLASS 1 OBESITY WITH BODY MASS INDEX (BMI) OF 30.0 TO 30.9 IN ADULT, UNSPECIFIED OBESITY TYPE, UNSPECIFIED WHETHER SERIOUS COMORBIDITY PRESENT: ICD-10-CM

## 2023-11-01 DIAGNOSIS — R29.818 SUSPECTED SLEEP APNEA: Primary | ICD-10-CM

## 2023-11-01 DIAGNOSIS — Z72.0 TOBACCO USER: ICD-10-CM

## 2023-11-01 DIAGNOSIS — R06.83 SNORING: ICD-10-CM

## 2023-11-01 DIAGNOSIS — G47.19 EXCESSIVE DAYTIME SLEEPINESS: ICD-10-CM

## 2023-11-01 NOTE — PROGRESS NOTES
[facility]  [facility], [address]  [place] [address]  Phone   Fax       Max Triana  [phone]   1980  42 y.o.  male      Referring physician/provider and PCP Mitra Tinoco APRN    Type of service: Initial Sleep Medicine Consult.  Date of service: 11/1/2023      Chief Complaint   Patient presents with    Snoring    Witnessed Apnea       History of present illness;  The patient was seen today on 11/1/2023 at [facility].    Thank you for asking to see Max Triana, 42 y.o. PMHx depression, GERD, chronic neck pain, 1 pack/day cigarette smoker.  The patient presents for initial evaluation of sleep sleep disordered breathing.  Patient  denies prior surgery namely tonsillectomy, nasal surgery or UPPP.     -Wife has noted witnessed apneas and loud snoring for past 2 years  Never had sleep study before    Obstructive Sleep Apnea Screening: STOP-BANG Sleep Apnea Questionnaire. Reference: Ozuna F et al. Br J Anaesth, 2012.     Criterion    Yes    No  Do you SNORE loudly?   [x]   Yes  []   No   Do you often feel TIRED, fatigued, or sleepy during the day?    [x]   Yes  []   No  Has anyone OBSERVED you stop breathing during your sleep?    [x]   Yes  []   No  Do you have or are you being treated for high blood PRESSURE?    []   Yes  [x]   No  BMI >32 kg/m2     []   Yes  [x]   No  AGE > 50 years    []   Yes  [x]   No  NECK circumference >16 inches / 40 cm    [x]   Yes  []   No  GENDER: male     [x]   Yes  []   No    YVAN Probability:  []   1-2 - Low  []   3-4 - Intermediate  [x]   5-8 - High      Further Sleep History:    Bedtime: Weekdays 9 PM; weekends 10 PM  Rise Time: Weekdays 5:40 AM; weekends 8 AM to 9 AM  Sleep Latency: 30 to 60 minutes  Screens in bed: Yes  Wake after sleep onset: 3 times  Reasons for awakenings: Nocturia  Number of naps per day 1 to 2/week  Naps restorative: No  Caffeine use: 2 sodas per day    RLS Symptoms:  "No   Bruxism:No   Current sleep related gastroesophageal reflux symptoms:  No   Cataplexy:  No   Sleep Paralysis:  No   Hypnagogic or hypnopompic hallucinations: No   Parasomnias such as sleep walking or sleep eating No     Disclaimer Sleep History: The above sleep history is based on this sleep physician's in room encounter with the patient. Pre encounter self administered questionnaires are taken into consideration and discussed with patient for any discordance. The above documentation by this sleep physician is the most accurate clinical information determined by in room sleep physician encounter with patient.     MEDICAL CONDITIONS (PMH)   Depression  Chronic neck pain  1 pack/day cigarette smoker  GERD    Social history:  Do you drive a commercial vehicle:  No   Shift work:  No   Tobacco use:  Yes 1 pack/day cigarette smoker for greater than 10 years  Alcohol use: 0 per week  Occupation: Fabricator    Family Hx (parents and siblings) (pertaining to sleep medicine)  Patient was adopted unable to provide    Medications: reviewed    Review of systems is negative unless otherwise noted per HPI   Disclaimer History: The above history is based on this sleep physician's in room encounter with the patient. Pre encounter self administered questionnaires are taken into consideration and discussed with patient for any discordance. The above documentation by this sleep physician is the most accurate clinical information determined by in room sleep physician encounter with patient.     Physical exam:  Vitals:    11/01/23 1300   BP: 126/83   BP Location: Left arm   Patient Position: Sitting   Pulse: 80   Resp: 12   SpO2: 97%   Weight: 101 kg (223 lb 6.4 oz)   Height: 182.9 cm (72\")    Body mass index is 30.3 kg/m².   CONSTITUTIONAL:  Non-toxic, In no overt distress   Head: normocephalic   ENT: Mallampati class IV, + macroglossia, no septal defects   NECK:Neck Circumference: 16.5 inches,no nuchal rigidity  RESPIRATORY SYSTEM: " Breath sounds are clear (no rales, no rhonchi, no wheezes), no accessory muscle use  CARDIOVASULAR SYSTEM: Heart sounds are regular rhythm and normal rate, no rub, no gallop, no edema  NEUROLOGICAL SYSTEM: Oriented x 3, No gross focal deficits   PSYCHIATRIC SYSTEM: Goal oriented, affect full range appropriate      Office notes from care team reviewed:    -9/7/2023 office visit PCP SAMANTHA Tinoco visit to establish care complaining of neck pain excessive daytime sleepiness patient was stated as interested in having sleep study concerning for sleep apnea    Labs reviewed.       -3/4/2021  Bicarb 27  H&H 15.2/44.2  MCV 81.8  -6/1/2018  Bicarb 27    Imaging/Diagnostics reviewed:     -11/22/2019 stress test cardiologist's Interpretation Summary:  · Myocardial perfusion imaging indicates a normal myocardial perfusion study with no evidence of ischemia.  · Resting defect likely secondary to technical factors  · Left ventricular ejection fraction is normal (Calculated EF = 65%).  · Low risk for ischemic heart disease.  · There is no prior study available for comparison.      Assessment and plan:  Suspected sleep apnea [R29.818] patient's symptoms and examination is consistent with sleep apnea (G47.30). I have talked to the patient about the signs and symptoms of sleep apnea. In addition, I have also discussed pathophysiology of sleep apnea.  I also discussed the complications of untreated sleep apnea including effects on hypertension, diabetes mellitus and nonrestorative sleep with hypersomnia which can increase risk for motor vehicle accidents.  Untreated sleep apnea is also a risk factor for development of atrial fibrillation, hypertension, insulin resistance and cerebrovascular accident.  Discussed in detail of various testing methods including home-based and lab based sleep studies.  Based on history and physical examination and other comorbidities the most appropriate study is Home Sleep Study.  The order for  the sleep study is placed in [facility].  The test will be scheduled after approval from insurance. Treatment and management will be discussed after the test is completed.  High pretest probability STOP-BANG 5/8, macroglossia, Mallampati is IV.  Home sleep study may rule in sleep apnea.  However, under patient's specific clinical circumstances home sleep study may not rule out sleep apnea.  If home sleep testing is negative must proceed with in laboratory polysomnography to definitively rule out sleep apnea (the prior was discussed with patient). Patient was given opportunity to ask questions and all the questions were answered.   Snoring (R06.83), snoring is the sound created by turbulent airflow vibrating upper airway soft tissue due to limitation of inspiratory airflow. I have also discussed factors affecting snoring including sleep deprivation, sleeping on the back and alcohol ingestion. To minimize snoring, patient is advised to have adequate sleep, sleep on the side and avoid alcohol and sedative medications before bedtime  Excessive daytime sleepiness .  Patient endorses subjective excessive daytime sleepiness with sleep physician encounter which was consistent with patient's pre-encounter self-administered [place] Sleepiness Scale of Total score: 15.  There are many causes for daytime excessive sleepiness including sleep depression, shiftwork syndrome, depression and other medical disorders including heart, kidney and liver failure.  The most serious cause of excessive sleepiness is due to neurological conditions like narcolepsy/cataplexy.  But the most common cause of excessive sleepiness is due to sleep apnea with frequent awakenings during sleep time.  I have discussed safety of driving and to remain vigilant while driving.  Obesity, patient's BMI is Body mass index is 30.3 kg/m².. I have discussed the relationship between weight and sleep apnea.There is direct correlation between weight and severity of sleep  apnea.  Weight reduction is encouraged, as it is going to reduce the severity of sleep apnea. I have also discussed with the patient diet and exercise to achieve ideal body weight.  Tobacco User,  Encouraged cessation. Follow up with PCP to encourage cessation.  Depression, Follow up with primary care physician for continued management. This medical condition would make the patient eligible for a trial of PAP therapy even if sleep study reveals mild severity sleep apnea.    I have also discussed with the patient the following  Sleep hygiene: Maintaining a regular bedtime and wake time, not to watch television or work in bed, limit caffeine-containing beverages before bed time and avoid naps during the day  Adequate amount of sleep.  Generally most people needs about 7 to 8 hours of sleep.      Return for 31 to 90 days after PAP setup with down load.  Patient's questions were answered      I once again thank you for asking me to see this patient in consultation and I have forwarded my opinion and treatment plan.  Please do not hesitate to call me if you have any questions.       EMR Dragon/Transcription disclaimer:   Much of this encounter note is an electronic transcription/translation of spoken language to printed text. The electronic translation of spoken language may permit erroneous, or at times, nonsensical words or phrases to be inadvertently transcribed; Although I have reviewed the note for such errors, some may still exist.     NPI #: [id number]    Devin Vaca, DO  Sleep Medicine  [facility]  11/01/23

## 2023-11-21 ENCOUNTER — HOSPITAL ENCOUNTER (OUTPATIENT)
Dept: SLEEP MEDICINE | Facility: HOSPITAL | Age: 43
Discharge: HOME | End: 2023-11-21
Admitting: FAMILY MEDICINE
Payer: COMMERCIAL

## 2023-11-21 DIAGNOSIS — R29.818 SUSPECTED SLEEP APNEA: ICD-10-CM

## 2023-11-21 DIAGNOSIS — G47.19 EXCESSIVE DAYTIME SLEEPINESS: ICD-10-CM

## 2023-11-21 DIAGNOSIS — R06.83 SNORING: ICD-10-CM

## 2023-11-21 PROCEDURE — 95806 SLEEP STUDY UNATT&RESP EFFT: CPT

## 2023-11-22 DIAGNOSIS — G47.19 EXCESSIVE DAYTIME SLEEPINESS: ICD-10-CM

## 2023-11-22 DIAGNOSIS — R06.83 SNORING: ICD-10-CM

## 2023-11-22 DIAGNOSIS — R29.818 SUSPECTED SLEEP APNEA: Primary | ICD-10-CM

## 2025-07-03 ENCOUNTER — OFFICE VISIT (OUTPATIENT)
Dept: FAMILY MEDICINE CLINIC | Facility: CLINIC | Age: 45
End: 2025-07-03
Payer: COMMERCIAL

## 2025-07-03 VITALS
OXYGEN SATURATION: 98 % | HEART RATE: 91 BPM | BODY MASS INDEX: 27.63 KG/M2 | HEIGHT: 72 IN | DIASTOLIC BLOOD PRESSURE: 78 MMHG | WEIGHT: 204 LBS | SYSTOLIC BLOOD PRESSURE: 128 MMHG

## 2025-07-03 DIAGNOSIS — M25.512 CHRONIC LEFT SHOULDER PAIN: Primary | ICD-10-CM

## 2025-07-03 DIAGNOSIS — G89.29 CHRONIC LEFT SHOULDER PAIN: Primary | ICD-10-CM

## 2025-07-03 PROCEDURE — 99213 OFFICE O/P EST LOW 20 MIN: CPT | Performed by: NURSE PRACTITIONER

## 2025-07-03 NOTE — PATIENT INSTRUCTIONS
Look at youtube for Frozen Shoulder exercises, also look for nerve flossing    As discussed, you can also try turmeric and black seed oil to decrease inflammation

## 2025-07-03 NOTE — PROGRESS NOTES
"Chief Complaint  Pain (Shoulder pain, 3 to 6 months now )    Subjective          Max Triana presents to [facility]    Symptoms are: chronic.   Onset was 1 to 6 months.   Symptoms occur: constantly.  Symptoms include: joint pain, myalgias and neck pain.   Pertinent negative symptoms include no abdominal pain, no anorexia, no change in stool, no chest pain, no chills, no congestion, no cough, no diaphoresis, no fatigue, no fever, no headaches, no joint swelling, no numbness, no rash, no sore throat, no swollen glands, no dysuria, no vertigo, no visual change, no vomiting and no weakness.   Treatment and/or Medications comments include: None   Additional information: Left shoulder hurts all the time but, extreme pain in certain movements/positions    Presents today with c/o left shoulder pain    Started 3-6 months ago, is affecting how he sleeps, how he takes his shirt off, etc.    Currently it is just mildly sore, certain movements, he has limited range of motion    He does not recall any injury    He endorses that he has issues with recurrent inflammation.  Tells me that he has random pain in the joints or in his neck  He also has diverticulosis, he has had inflammation in the cartilage in his chest from time to time as well    Review of Systems   Constitutional:  Negative for chills, diaphoresis, fatigue and fever.   HENT:  Negative for congestion and sore throat.    Respiratory:  Negative for cough.    Cardiovascular:  Negative for chest pain.   Gastrointestinal:  Negative for abdominal pain, anorexia and vomiting.   Genitourinary:  Negative for dysuria.   Musculoskeletal:  Positive for joint pain, myalgias and neck pain.   Skin:  Negative for rash.   Neurological:  Negative for vertigo, weakness, numbness and headaches.     Objective   Vital Signs:  /78   Pulse 91   Ht 182.9 cm (72.01\")   Wt 92.5 kg (204 lb)   SpO2 98%   BMI 27.66 kg/m²     BP Readings from Last " 3 Encounters:   07/03/25 128/78   11/01/23 126/83   09/07/23 120/80        Wt Readings from Last 3 Encounters:   07/03/25 92.5 kg (204 lb)   11/01/23 101 kg (223 lb 6.4 oz)   09/07/23 99.8 kg (220 lb)              Physical Exam  Vitals reviewed.   Constitutional:       Appearance: Normal appearance.   Cardiovascular:      Rate and Rhythm: Normal rate.   Pulmonary:      Effort: Pulmonary effort is normal.   Musculoskeletal:      Left shoulder: Tenderness present. No swelling. Decreased range of motion. Normal strength.   Neurological:      Mental Status: He is alert and oriented to person, place, and time.        Result Review :                 Assessment and Plan    Diagnoses and all orders for this visit:    1. Chronic left shoulder pain (Primary)  -     MRI Shoulder Left Without Contrast; Future    Encouraged gentle range of motion, check youtube for videos for frozen shoulder and nerve flossing       Follow Up   Return if symptoms worsen or fail to improve, for Annual physical.  Patient was given instructions and counseling regarding his condition or for health maintenance advice. Please see specific information pulled into the AVS if appropriate.

## 2025-07-10 ENCOUNTER — RESULTS FOLLOW-UP (OUTPATIENT)
Dept: FAMILY MEDICINE CLINIC | Facility: CLINIC | Age: 45
End: 2025-07-10
Payer: COMMERCIAL

## 2025-07-10 DIAGNOSIS — M25.512 CHRONIC LEFT SHOULDER PAIN: ICD-10-CM

## 2025-07-10 DIAGNOSIS — G89.29 CHRONIC LEFT SHOULDER PAIN: ICD-10-CM

## 2025-07-10 NOTE — TELEPHONE ENCOUNTER
I spoke with patient about mri results and option to be referred to orthopedic doctor. He said he would think about getting a referral. Patient is going to call us back if he would like to proceed with orthopedic referral.   112.4